# Patient Record
Sex: FEMALE | Race: WHITE | Employment: OTHER | URBAN - METROPOLITAN AREA
[De-identification: names, ages, dates, MRNs, and addresses within clinical notes are randomized per-mention and may not be internally consistent; named-entity substitution may affect disease eponyms.]

---

## 2017-05-26 ENCOUNTER — HOSPITAL ENCOUNTER (OUTPATIENT)
Dept: OTHER | Age: 62
Discharge: HOME | End: 2017-05-26
Attending: CLINICAL NURSE SPECIALIST | Admitting: CLINICAL NURSE SPECIALIST

## 2017-05-26 LAB
ANION GAP SERPL CALCULATED.3IONS-SCNC: 16 MMOL/L (ref 3–16)
BASOPHILS ABSOLUTE: 0.1 K/UL (ref 0–0.2)
BASOPHILS RELATIVE PERCENT: 1 %
BUN BLDV-MCNC: 14 MG/DL (ref 7–20)
CALCIUM SERPL-MCNC: 8.9 MG/DL (ref 8.3–10.6)
CHLORIDE BLD-SCNC: 100 MMOL/L (ref 99–110)
CO2: 25 MMOL/L (ref 21–32)
CORTISOL - AM: 9.4 UG/DL (ref 4.3–22.4)
CREAT SERPL-MCNC: 0.6 MG/DL (ref 0.6–1.1)
EOSINOPHILS ABSOLUTE: 0.1 K/UL (ref 0–0.6)
EOSINOPHILS RELATIVE PERCENT: 1.4 %
GFR AFRICAN AMERICAN: >60
GFR NON-AFRICAN AMERICAN: >60
GLUCOSE BLD-MCNC: 104 MG/DL (ref 70–99)
HCT VFR BLD CALC: 45.3 % (ref 36–48)
HEMOGLOBIN: 14.9 G/DL (ref 12–16)
IGA: 203 MG/DL (ref 70–400)
LYMPHOCYTES ABSOLUTE: 2.2 K/UL (ref 1–5.1)
LYMPHOCYTES RELATIVE PERCENT: 21.4 %
MCH RBC QN AUTO: 28.5 PG (ref 26–34)
MCHC RBC AUTO-ENTMCNC: 33 G/DL (ref 31–36)
MCV RBC AUTO: 86.3 FL (ref 80–100)
MONOCYTES ABSOLUTE: 0.6 K/UL (ref 0–1.3)
MONOCYTES RELATIVE PERCENT: 5.5 %
NEUTROPHILS ABSOLUTE: 7.2 K/UL (ref 1.7–7.7)
NEUTROPHILS RELATIVE PERCENT: 70.7 %
PDW BLD-RTO: 15 % (ref 12.4–15.4)
PLATELET # BLD: 254 K/UL (ref 135–450)
PMV BLD AUTO: 8.9 FL (ref 5–10.5)
POTASSIUM SERPL-SCNC: 4.3 MMOL/L (ref 3.5–5.1)
RBC # BLD: 5.24 M/UL (ref 4–5.2)
SODIUM BLD-SCNC: 141 MMOL/L (ref 136–145)
TSH SERPL DL<=0.05 MIU/L-ACNC: 2.6 UIU/ML (ref 0.27–4.2)
WBC # BLD: 10.1 K/UL (ref 4–11)

## 2017-05-28 LAB — TISSUE TRANSGLUTAMINASE IGA: 1 U/ML (ref 0–3)

## 2017-11-06 ENCOUNTER — OFFICE VISIT (OUTPATIENT)
Dept: SURGERY | Age: 62
End: 2017-11-06

## 2017-11-06 VITALS
BODY MASS INDEX: 43.05 KG/M2 | HEIGHT: 61 IN | DIASTOLIC BLOOD PRESSURE: 80 MMHG | WEIGHT: 228 LBS | SYSTOLIC BLOOD PRESSURE: 124 MMHG

## 2017-11-06 DIAGNOSIS — R10.31 CHRONIC RLQ PAIN: Primary | ICD-10-CM

## 2017-11-06 DIAGNOSIS — G89.29 CHRONIC RLQ PAIN: Primary | ICD-10-CM

## 2017-11-06 PROCEDURE — 99243 OFF/OP CNSLTJ NEW/EST LOW 30: CPT | Performed by: SURGERY

## 2017-11-06 RX ORDER — FLUOXETINE HYDROCHLORIDE 20 MG/1
10 CAPSULE ORAL DAILY
COMMUNITY
End: 2018-03-22 | Stop reason: ALTCHOICE

## 2017-11-06 RX ORDER — ALPRAZOLAM 0.25 MG/1
0.5 TABLET ORAL DAILY
COMMUNITY

## 2017-11-06 ASSESSMENT — ENCOUNTER SYMPTOMS
ABDOMINAL PAIN: 1
NAUSEA: 1
DIARRHEA: 1
CONSTIPATION: 1
EYE REDNESS: 1
ABDOMINAL DISTENTION: 1
EYE DISCHARGE: 1
ALLERGIC/IMMUNOLOGIC NEGATIVE: 1
SHORTNESS OF BREATH: 1
VOMITING: 1
SINUS PRESSURE: 1

## 2017-11-06 NOTE — LETTER
[address] [address]  [address] [place] [address] Road  Phone: [phone]  Fax: [phone]    Kierra Andrew MD        November 9, 2017     [place] [address]    Patient: Margaret Shine  MR Number: [id number]  YOB: 1955  Date of Visit: 11/6/2017    Dear Dr. MARQUEZ [facility]:    Thank you for the request for consultation for [facility]. Below are the relevant portions of my assessment and plan of care. Assessment:     72-year-old female who presents for evaluation of intermittent right lower quadrant abdominal pain over the last 3 years. The pain has been more bothersome over the last 2 months. She reports associated nausea, vomiting, decreased appetite,, sweats, chills and diarrhea. Multiple studies including CAT scan, MRI, vaginal ultrasound and colonoscopy are unremarkable. She has also seen her gynecologist, Dr. Kameron Daley, who feels that there are no obvious gynecologic etiologies of her pain. Plan:     No further workup is warranted at this time. Discussed diagnostic laparoscopy and appendectomy with the patient. She understands that this will likely offer a low yield for a cause of her pain but is very persistent about proceeding with surgery. Will make arrangements to proceed with laparoscopy in combination with her gynecologist Dr. Stephani Verde. If you have questions, please do not hesitate to call me. I look forward to following Frankie Slaughter along with you.     Sincerely,        Kierra Andrew MD

## 2017-11-06 NOTE — PROGRESS NOTES
Subjective:      Maggi Jean Baptiste is a 63 y.o. female     CC: RLQ pain    HPI: 63year old female who presents for evaluation of intermittent RLQ pain over the last 3 years. The pain has been more bothersome over the last 2 months. Associated symptoms N/V, decreased appetite, sweats, chills and diarrhea. No urinary symptoms. Reports fatigue and joint aches. Multiple studies are negative including multiple CT scan, MRI, vaginal U/S and colonoscopy. No obvious gynecologic problems per Dr. [place]. Family History   Problem Relation Age of Onset    Heart Disease Father        Past Medical History:   Diagnosis Date    Hyperlipidemia     Hypertension        Past Surgical History:   Procedure Laterality Date    BLADDER SURGERY      bladder tuck    CHOLECYSTECTOMY      ENDOMETRIAL ABLATION      JOINT REPLACEMENT  2008    left knee           Prior to Visit Medications    Medication Sig Taking? Authorizing Provider   FLUoxetine (PROZAC) 20 MG capsule Take 20 mg by mouth daily Yes Historical Provider, MD   ALPRAZolam (XANAX) 0.5 MG tablet Take 0.5 mg by mouth nightly as needed for Sleep Yes Historical Provider, MD   carvedilol (COREG) 12.5 MG tablet Take 1 tablet by mouth 2 times daily (with meals) Yes Edson Hall NP   ATORVASTATIN CALCIUM PO Take by mouth Yes Historical Provider, MD   venlafaxine (EFFEXOR-XR) 150 MG XR capsule Take 150 mg by mouth daily 2 tabs a day  Historical Provider, MD   hydrochlorothiazide (HYDRODIURIL) 25 MG tablet Take 1 tablet by mouth daily  Marisol Tripathi MD       Social History     Social History    Marital status:      Spouse name: N/A    Number of children: N/A    Years of education: N/A     Occupational History    Not on file.      Social History Main Topics    Smoking status: Current Every Day Smoker     Packs/day: 0.25    Smokeless tobacco: Never Used    Alcohol use No    Drug use: Unknown    Sexual activity: Not on file     Other Topics Concern    Not on file Social History Narrative    No narrative on file       Review of Systems   Constitutional: Positive for activity change, appetite change, chills, fatigue and fever. HENT: Positive for ear pain and sinus pressure. Eyes: Positive for discharge and redness. Respiratory: Positive for shortness of breath. Cardiovascular: Negative. Gastrointestinal: Positive for abdominal distention, abdominal pain, constipation, diarrhea, nausea and vomiting. Endocrine: Negative. Genitourinary: Negative. Musculoskeletal: Negative. Skin: Negative. Allergic/Immunologic: Negative. Neurological: Positive for light-headedness. Hematological: Negative. Psychiatric/Behavioral: Positive for suicidal ideas. The patient is nervous/anxious. Objective:   Physical Exam   Constitutional: She is oriented to person, place, and time. She appears well-developed and well-nourished. HENT:   Head: Normocephalic and atraumatic. Right Ear: External ear normal.   Left Ear: External ear normal.   Eyes: Conjunctivae and EOM are normal.   Neck: Normal range of motion. Neck supple. Cardiovascular: Normal rate and regular rhythm. Pulmonary/Chest: Effort normal and breath sounds normal.   Abdominal: Soft. She exhibits no distension. There is no tenderness. Musculoskeletal: Normal range of motion. She exhibits no edema. Neurological: She is alert and oriented to person, place, and time. Skin: Skin is warm and dry. Psychiatric: She has a normal mood and affect. Her behavior is normal.       Assessment:      65-year-old female who presents for evaluation of intermittent right lower quadrant abdominal pain over the last 3 years. The pain has been more bothersome over the last 2 months. She reports associated nausea, vomiting, decreased appetite,, sweats, chills and diarrhea. Multiple studies including CAT scan, MRI, vaginal ultrasound and colonoscopy are unremarkable.   She has also seen her gynecologist,

## 2017-11-09 PROBLEM — G89.29 CHRONIC RLQ PAIN: Status: ACTIVE | Noted: 2017-11-09

## 2017-11-09 PROBLEM — R10.31 CHRONIC RLQ PAIN: Status: ACTIVE | Noted: 2017-11-09

## 2017-11-09 NOTE — COMMUNICATION BODY
Assessment:     65-year-old female who presents for evaluation of intermittent right lower quadrant abdominal pain over the last 3 years. The pain has been more bothersome over the last 2 months. She reports associated nausea, vomiting, decreased appetite,, sweats, chills and diarrhea. Multiple studies including CAT scan, MRI, vaginal ultrasound and colonoscopy are unremarkable. She has also seen her gynecologist, Dr. Milady Arora, who feels that there are no obvious gynecologic etiologies of her pain. Plan:     No further workup is warranted at this time. Discussed diagnostic laparoscopy and appendectomy with the patient. She understands that this will likely offer a low yield for a cause of her pain but is very persistent about proceeding with surgery. Will make arrangements to proceed with laparoscopy in combination with her gynecologist Dr. Anastacio Conner.

## 2017-11-17 ENCOUNTER — TELEPHONE (OUTPATIENT)
Dept: SURGERY | Age: 62
End: 2017-11-17

## 2017-11-17 NOTE — TELEPHONE ENCOUNTER
Pt is calling for her CT scan result from [address].      Please call pt when we get those    Thanks

## 2017-11-28 ENCOUNTER — HOSPITAL ENCOUNTER (OUTPATIENT)
Dept: OTHER | Age: 62
Discharge: HOME | End: 2017-11-28
Attending: OBSTETRICS & GYNECOLOGY | Admitting: OBSTETRICS & GYNECOLOGY

## 2017-11-28 LAB
A/G RATIO: 1 (ref 1.1–2.2)
ABO/RH: NORMAL
ALBUMIN SERPL-MCNC: 3.7 G/DL (ref 3.4–5)
ALP BLD-CCNC: 110 U/L (ref 40–129)
ALT SERPL-CCNC: 13 U/L (ref 10–40)
ANION GAP SERPL CALCULATED.3IONS-SCNC: 19 MMOL/L (ref 3–16)
ANTIBODY SCREEN: NORMAL
AST SERPL-CCNC: 14 U/L (ref 15–37)
BASOPHILS ABSOLUTE: 0.1 K/UL (ref 0–0.2)
BASOPHILS RELATIVE PERCENT: 1 %
BILIRUB SERPL-MCNC: 0.4 MG/DL (ref 0–1)
BUN BLDV-MCNC: 16 MG/DL (ref 7–20)
CALCIUM SERPL-MCNC: 9.6 MG/DL (ref 8.3–10.6)
CHLORIDE BLD-SCNC: 100 MMOL/L (ref 99–110)
CO2: 25 MMOL/L (ref 21–32)
CREAT SERPL-MCNC: 0.7 MG/DL (ref 0.6–1.2)
EOSINOPHILS ABSOLUTE: 0.1 K/UL (ref 0–0.6)
EOSINOPHILS RELATIVE PERCENT: 1.4 %
GFR AFRICAN AMERICAN: >60
GFR NON-AFRICAN AMERICAN: >60
GLOBULIN: 3.7 G/DL
GLUCOSE BLD-MCNC: 97 MG/DL (ref 70–99)
HCT VFR BLD CALC: 44.4 % (ref 36–48)
HEMOGLOBIN: 14.7 G/DL (ref 12–16)
LYMPHOCYTES ABSOLUTE: 1.9 K/UL (ref 1–5.1)
LYMPHOCYTES RELATIVE PERCENT: 19.2 %
MCH RBC QN AUTO: 28.3 PG (ref 26–34)
MCHC RBC AUTO-ENTMCNC: 33.2 G/DL (ref 31–36)
MCV RBC AUTO: 85.4 FL (ref 80–100)
MONOCYTES ABSOLUTE: 0.5 K/UL (ref 0–1.3)
MONOCYTES RELATIVE PERCENT: 5.1 %
NEUTROPHILS ABSOLUTE: 7.1 K/UL (ref 1.7–7.7)
NEUTROPHILS RELATIVE PERCENT: 73.3 %
PDW BLD-RTO: 15.1 % (ref 12.4–15.4)
PLATELET # BLD: 251 K/UL (ref 135–450)
PMV BLD AUTO: 9.1 FL (ref 5–10.5)
POTASSIUM SERPL-SCNC: 4.7 MMOL/L (ref 3.5–5.1)
RBC # BLD: 5.2 M/UL (ref 4–5.2)
SODIUM BLD-SCNC: 144 MMOL/L (ref 136–145)
TOTAL PROTEIN: 7.4 G/DL (ref 6.4–8.2)
WBC # BLD: 9.7 K/UL (ref 4–11)

## 2017-11-29 LAB
EKG ATRIAL RATE: 65 BPM
EKG DIAGNOSIS: NORMAL
EKG P AXIS: 54 DEGREES
EKG P-R INTERVAL: 154 MS
EKG Q-T INTERVAL: 410 MS
EKG QRS DURATION: 84 MS
EKG QTC CALCULATION (BAZETT): 426 MS
EKG R AXIS: 54 DEGREES
EKG T AXIS: 58 DEGREES
EKG VENTRICULAR RATE: 65 BPM

## 2017-11-29 PROCEDURE — 93010 ELECTROCARDIOGRAM REPORT: CPT | Performed by: INTERNAL MEDICINE

## 2017-12-06 ENCOUNTER — HOSPITAL ENCOUNTER (OUTPATIENT)
Dept: SURGERY | Age: 62
Discharge: HOME | End: 2017-12-06
Attending: SURGERY | Admitting: OBSTETRICS & GYNECOLOGY

## 2017-12-06 VITALS
HEART RATE: 64 BPM | HEIGHT: 61 IN | TEMPERATURE: 97.5 F | SYSTOLIC BLOOD PRESSURE: 147 MMHG | DIASTOLIC BLOOD PRESSURE: 70 MMHG | BODY MASS INDEX: 42.54 KG/M2 | WEIGHT: 225.31 LBS | OXYGEN SATURATION: 94 % | RESPIRATION RATE: 15 BRPM

## 2017-12-06 LAB
ABO/RH: NORMAL
ANTIBODY SCREEN: NORMAL

## 2017-12-06 PROCEDURE — 44970 LAPAROSCOPY APPENDECTOMY: CPT | Performed by: SURGERY

## 2017-12-06 RX ORDER — LIDOCAINE HYDROCHLORIDE 10 MG/ML
0.5 INJECTION, SOLUTION EPIDURAL; INFILTRATION; INTRACAUDAL; PERINEURAL ONCE
Status: DISCONTINUED | OUTPATIENT
Start: 2017-12-06 | End: 2017-12-07 | Stop reason: HOSPADM

## 2017-12-06 RX ORDER — LABETALOL HYDROCHLORIDE 5 MG/ML
5 INJECTION, SOLUTION INTRAVENOUS EVERY 10 MIN PRN
Status: DISCONTINUED | OUTPATIENT
Start: 2017-12-06 | End: 2017-12-07 | Stop reason: HOSPADM

## 2017-12-06 RX ORDER — SODIUM CHLORIDE, SODIUM LACTATE, POTASSIUM CHLORIDE, CALCIUM CHLORIDE 600; 310; 30; 20 MG/100ML; MG/100ML; MG/100ML; MG/100ML
INJECTION, SOLUTION INTRAVENOUS CONTINUOUS
Status: DISCONTINUED | OUTPATIENT
Start: 2017-12-06 | End: 2017-12-07 | Stop reason: HOSPADM

## 2017-12-06 RX ORDER — CEFAZOLIN SODIUM 2 G/100ML
2 INJECTION, SOLUTION INTRAVENOUS ONCE
Status: DISCONTINUED | OUTPATIENT
Start: 2017-12-06 | End: 2017-12-06 | Stop reason: SDUPTHER

## 2017-12-06 RX ORDER — APREPITANT 40 MG/1
40 CAPSULE ORAL ONCE
Status: COMPLETED | OUTPATIENT
Start: 2017-12-06 | End: 2017-12-06

## 2017-12-06 RX ORDER — SODIUM CHLORIDE 0.9 % (FLUSH) 0.9 %
10 SYRINGE (ML) INJECTION EVERY 12 HOURS SCHEDULED
Status: DISCONTINUED | OUTPATIENT
Start: 2017-12-06 | End: 2017-12-07 | Stop reason: HOSPADM

## 2017-12-06 RX ORDER — OXYCODONE HYDROCHLORIDE AND ACETAMINOPHEN 5; 325 MG/1; MG/1
1-2 TABLET ORAL EVERY 4 HOURS PRN
Qty: 30 TABLET | Refills: 0 | Status: SHIPPED | OUTPATIENT
Start: 2017-12-06 | End: 2017-12-13

## 2017-12-06 RX ORDER — HYDROMORPHONE HCL 110MG/55ML
0.5 PATIENT CONTROLLED ANALGESIA SYRINGE INTRAVENOUS EVERY 5 MIN PRN
Status: DISCONTINUED | OUTPATIENT
Start: 2017-12-06 | End: 2017-12-07 | Stop reason: HOSPADM

## 2017-12-06 RX ORDER — HYDRALAZINE HYDROCHLORIDE 20 MG/ML
5 INJECTION INTRAMUSCULAR; INTRAVENOUS EVERY 10 MIN PRN
Status: DISCONTINUED | OUTPATIENT
Start: 2017-12-06 | End: 2017-12-07 | Stop reason: HOSPADM

## 2017-12-06 RX ORDER — SODIUM CHLORIDE 0.9 % (FLUSH) 0.9 %
10 SYRINGE (ML) INJECTION PRN
Status: DISCONTINUED | OUTPATIENT
Start: 2017-12-06 | End: 2017-12-07 | Stop reason: HOSPADM

## 2017-12-06 RX ORDER — PROMETHAZINE HYDROCHLORIDE 25 MG/ML
6.25 INJECTION, SOLUTION INTRAMUSCULAR; INTRAVENOUS EVERY 10 MIN PRN
Status: DISCONTINUED | OUTPATIENT
Start: 2017-12-06 | End: 2017-12-07 | Stop reason: HOSPADM

## 2017-12-06 RX ORDER — ONDANSETRON 2 MG/ML
4 INJECTION INTRAMUSCULAR; INTRAVENOUS
Status: ACTIVE | OUTPATIENT
Start: 2017-12-06 | End: 2017-12-06

## 2017-12-06 RX ORDER — SCOLOPAMINE TRANSDERMAL SYSTEM 1 MG/1
1 PATCH, EXTENDED RELEASE TRANSDERMAL ONCE
Status: DISCONTINUED | OUTPATIENT
Start: 2017-12-06 | End: 2017-12-07 | Stop reason: HOSPADM

## 2017-12-06 RX ORDER — LIDOCAINE HYDROCHLORIDE 10 MG/ML
1 INJECTION, SOLUTION EPIDURAL; INFILTRATION; INTRACAUDAL; PERINEURAL
Status: ACTIVE | OUTPATIENT
Start: 2017-12-06 | End: 2017-12-06

## 2017-12-06 RX ORDER — MEPERIDINE HYDROCHLORIDE 25 MG/ML
12.5 INJECTION INTRAMUSCULAR; INTRAVENOUS; SUBCUTANEOUS EVERY 5 MIN PRN
Status: DISCONTINUED | OUTPATIENT
Start: 2017-12-06 | End: 2017-12-07 | Stop reason: HOSPADM

## 2017-12-06 RX ORDER — OXYCODONE HYDROCHLORIDE AND ACETAMINOPHEN 5; 325 MG/1; MG/1
1 TABLET ORAL
Status: COMPLETED | OUTPATIENT
Start: 2017-12-06 | End: 2017-12-06

## 2017-12-06 RX ADMIN — OXYCODONE HYDROCHLORIDE AND ACETAMINOPHEN 1 TABLET: 5; 325 TABLET ORAL at 15:45

## 2017-12-06 RX ADMIN — SODIUM CHLORIDE, SODIUM LACTATE, POTASSIUM CHLORIDE, CALCIUM CHLORIDE: 600; 310; 30; 20 INJECTION, SOLUTION INTRAVENOUS at 11:35

## 2017-12-06 RX ADMIN — APREPITANT 40 MG: 40 CAPSULE ORAL at 11:39

## 2017-12-06 ASSESSMENT — PAIN SCALES - GENERAL
PAINLEVEL_OUTOF10: 4
PAINLEVEL_OUTOF10: 4

## 2017-12-06 ASSESSMENT — PAIN DESCRIPTION - LOCATION: LOCATION: ABDOMEN

## 2017-12-06 ASSESSMENT — PAIN DESCRIPTION - PAIN TYPE: TYPE: SURGICAL PAIN

## 2017-12-06 ASSESSMENT — PAIN - FUNCTIONAL ASSESSMENT: PAIN_FUNCTIONAL_ASSESSMENT: 0-10

## 2017-12-06 NOTE — ANESTHESIA POST-OP
Anesthesia Post-op Note    Patient: Debora Opitz  MRN: [id number]  YOB: 1955  Date of evaluation: 12/6/2017  Time:  2:37 PM     Procedure(s) Performed:     Last Vitals: /79   Pulse 60   Temp 97.5 °F (36.4 °C) (Temporal)   Resp 17   Ht 5' 1\" (1.549 m)   Wt 225 lb 5 oz (102.2 kg)   SpO2 98%   BMI 42.57 kg/m²     Antonietta Phase I: Antonietta Score: 3    Antonietta Phase II:      Anesthesia Post Evaluation    Final anesthesia type: general  Patient location during evaluation: PACU  Patient participation: complete - patient participated  Level of consciousness: awake and alert  Airway patency: patent  Nausea & Vomiting: no nausea and no vomiting  Complications: no  Cardiovascular status: hemodynamically stable  Respiratory status: acceptable  Hydration status: stable        Nicole Lloyd MD  2:37 PM

## 2017-12-06 NOTE — PROGRESS NOTES
Discharge instructions reviewed with pt and pt's mom, discussed medications, VU, denies any further questions or anxiety related to discharge. IV discontinued and dressing applied. Pt discharged to home with mother. Taken from room via wheelchair by this nurse, personal belongings taken with. New medications supplied through outpatient pharmacy.

## 2017-12-06 NOTE — ANESTHESIA PRE-OP
Department of Anesthesiology  Preprocedure Note       Name:  Chica Kahn   Age:  63 y.o.  :  1955                                          MRN:  [id number]         Date:  2017      Surgeon:    Procedure:    Medications prior to admission:   Prior to Admission medications    Medication Sig Start Date End Date Taking? Authorizing Provider   aspirin-acetaminophen-caffeine (EXCEDRIN MIGRAINE) 431-180-78 MG per tablet Take 1 tablet by mouth every 6 hours as needed for Headaches    Historical Provider, MD   FLUoxetine (PROZAC) 20 MG capsule Take 10 mg by mouth daily     Historical Provider, MD   ALPRAZolam (XANAX) 0.5 MG tablet Take 0.5 mg by mouth 2 times daily as needed for Sleep  . Historical Provider, MD   carvedilol (COREG) 12.5 MG tablet Take 1 tablet by mouth 2 times daily (with meals)  Patient taking differently: Take 25 mg by mouth 2 times daily (with meals)  12/23/15   Debra Rodriguez NP   ATORVASTATIN CALCIUM PO Take 40 mg by mouth daily     Historical Provider, MD   hydrochlorothiazide (HYDRODIURIL) 25 MG tablet Take 1 tablet by mouth daily  Patient taking differently: Take 25 mg by mouth daily NOT TAKING 12/8/15   Ryan Josue MD       Current medications:    Current Outpatient Prescriptions   Medication Sig Dispense Refill    aspirin-acetaminophen-caffeine (EXCEDRIN MIGRAINE) 250-250-65 MG per tablet Take 1 tablet by mouth every 6 hours as needed for Headaches      FLUoxetine (PROZAC) 20 MG capsule Take 10 mg by mouth daily       ALPRAZolam (XANAX) 0.5 MG tablet Take 0.5 mg by mouth 2 times daily as needed for Sleep  .       carvedilol (COREG) 12.5 MG tablet Take 1 tablet by mouth 2 times daily (with meals) (Patient taking differently: Take 25 mg by mouth 2 times daily (with meals) ) 60 tablet 3    ATORVASTATIN CALCIUM PO Take 40 mg by mouth daily       hydrochlorothiazide (HYDRODIURIL) 25 MG tablet Take 1 tablet by mouth daily (Patient taking differently: Take 25 mg by mouth daily comment: Current smoker   Cardiovascular:    (+) hypertension:, hyperlipidemia    (-) past MI, CAD, CABG/stent, dysrhythmias,  angina and  CHF    ECG reviewed  Rhythm: regular  Rate: normal           Beta Blocker:  Dose within 24 Hrs         Neuro/Psych:   (+) headaches: migraine headaches, psychiatric history (Anxiety, Depression):depression/anxiety    (-) seizures, TIA and CVA           GI/Hepatic/Renal:   (+) morbid obesity     (-) GERD, liver disease and no renal disease       Endo/Other: Negative Endo/Other ROS       (-) hypothyroidism, hyperthyroidism, no Type II DM               Abdominal:   (+) obese (morbid),         Vascular:                                      Anesthesia Plan      general     ASA 3       Induction: intravenous. Anesthetic plan and risks discussed with patient. Plan discussed with CRNA.                   Sherry Swanson MD   12/6/2017

## 2017-12-06 NOTE — BRIEF OP NOTE
Brief Postoperative Note    Ravi Kahn  YOB: 1955  [id number]    Pre-operative Diagnosis: chronic RLQ pain     Post-operative Diagnosis: Same    Procedure: laparoscopy, removal of R tube and ovary, appendectomy    Anesthesia: General and Local    Surgeons/Assistants: Nidia Boone    Estimated Blood Loss: less than 50     Complications: None    Specimens: Was Obtained: R tube and ovary, appendix    Findings:     Electronically signed by Jose Shields MD on 12/6/2017 at 1:53 PM

## 2017-12-06 NOTE — PROGRESS NOTES
Dr. Domenica Crane in to see patient. All questions answered. H & P updated and antibiotic ordered. Family at bedside.   Jennifer Daley

## 2017-12-07 NOTE — OP NOTE
[facility]                      [address] [facility], [address] [facility]                                 OPERATIVE REPORT    PATIENT NAME: Rodríguez Meeks                     :        1955  MED REC NO:   [id number]                          ROOM:  ACCOUNT NO:   [de-identified]                          ADMIT DATE: 2017  PROVIDER:     Anastacio Conner MD    DATE OF PROCEDURE:  2017        PREOPERATIVE DIAGNOSIS:  Chronic right lower quadrant pain. POSTOPERATIVE DIAGNOSIS:  Chronic right lower quadrant pain. OPERATION PERFORMED:  Diagnostic laparoscopy, right salpingo-oophorectomy,  and drainage of left ovarian cyst.    SURGEON:  Anastacio Conner M.D.    ESTIMATED BLOOD LOSS:  Minimal.    COMPLICATIONS:  None. FINDINGS:  1. Irregular-shaped uterus consistent with known fibroid. 2.  Left ovary with simple ovarian cyst, drained. 3.  Clear anterior and posterior cul-de-sac. 4.  Right adnexa within normal limits. INDICATIONS:  The patient is a 74-year-old female who has had a long-term  history of right lower quadrant pain. She has undergone CT of the abdomen  and pelvis and colonoscopy, neither of which revealed any pathology. She  last had an ultrasound of the pelvis in , which did show a small  ovarian cyst and small fibroids. At this point, she is requesting  diagnostic laparoscopy, which will be performed in conjunction with Dr. Efe Blanca. Risks, benefits, and alternatives were reviewed with the  patient prior to surgery. DETAILS OF PROCEDURE:  The patient was taken to the operating room where  general anesthesia was performed. She was then placed in dorsal lithotomy  position, prepped and draped in the normal sterile fashion. A single-tooth  tenaculum was placed on the anterior lip of the cervix and the cervix was  gently dilated to allow passage of a Kroner uterine manipulator.   A Torres  was placed and attention was turned to the
completion of Dr. Abdiel Littlejohn procedure, the appendix was identified in  the right lower quadrant. It was grossly unremarkable. The mesoappendix  was divided with the LigaSure and then the appendix was cleared  circumferentially at the base. The appendix was divided with a transverse  firing of an Endo MAXIM blue stapler load. It was then placed in the  EndoCatch bag and removed through the left lower quadrant trocar site. Trocar was then reinserted. The cecum appeared grossly unremarkable. We  had excellent hemostasis at the operative site and good security of the  staple line on the cecum. In a retrograde fashion, we ran at least 2 feet  of small bowel. She has no evidence of Meckel diverticulum. There was no  evidence of active Crohn disease. The fascia of the left lower quadrant trocar site was closed with a 0  Vicryl suture using a Chevy Getting needle. The other two trocars were  then removed under direct visualization and the abdomen was de-insufflated. All areas were injected with 0.5% Marcaine with epinephrine for the skin  and the incisions were closed with running 4-0 subcuticular sutures. Dermabond was then applied. The patient tolerated the procedure without  difficulty and was transferred to recovery room in stable condition.         Familia Spear MD    D: 12/06/2017 14:10:28       T: 12/06/2017 14:21:08     [id number]  Job#: [id number]     Doc#: [id number]    CC:  MD Lalo Sims MD

## 2018-03-17 LAB — C DIFFICILE TOXIN, EIA: NORMAL

## 2018-03-18 LAB
CLOSTRIDIUM DIFFICILE DNA AMPLIFICATION: NORMAL
GI BACTERIAL PATHOGENS BY PCR: NORMAL

## 2018-03-19 LAB
CRYPTOSPORIDIUM ANTIGEN STOOL: NORMAL
E HISTOLYTICA ANTIGEN STOOL: NORMAL
GIARDIA ANTIGEN STOOL: NORMAL

## 2018-03-22 ENCOUNTER — HOSPITAL ENCOUNTER (OUTPATIENT)
Dept: OTHER | Age: 63
Discharge: HOME | End: 2018-03-22
Attending: INTERNAL MEDICINE | Admitting: INTERNAL MEDICINE

## 2018-04-16 ENCOUNTER — HOSPITAL ENCOUNTER (OUTPATIENT)
Dept: OTHER | Age: 63
Discharge: HOME | End: 2018-04-16
Attending: INTERNAL MEDICINE | Admitting: INTERNAL MEDICINE

## 2018-04-18 ENCOUNTER — HOSPITAL ENCOUNTER (OUTPATIENT)
Dept: ENDOSCOPY | Age: 63
Discharge: HOME | End: 2018-04-18
Attending: INTERNAL MEDICINE | Admitting: INTERNAL MEDICINE

## 2018-04-18 RX ORDER — CARVEDILOL 25 MG/1
25 TABLET ORAL ONCE
Status: DISCONTINUED | OUTPATIENT
Start: 2018-04-18 | End: 2018-04-19 | Stop reason: HOSPADM

## 2018-05-10 ENCOUNTER — TELEPHONE (OUTPATIENT)
Dept: SURGERY | Age: 63
End: 2018-05-10

## 2018-05-29 ENCOUNTER — OFFICE VISIT (OUTPATIENT)
Dept: SURGERY | Age: 63
End: 2018-05-29

## 2018-05-29 VITALS
WEIGHT: 202 LBS | HEIGHT: 60 IN | BODY MASS INDEX: 39.66 KG/M2 | DIASTOLIC BLOOD PRESSURE: 96 MMHG | SYSTOLIC BLOOD PRESSURE: 138 MMHG

## 2018-05-29 DIAGNOSIS — G89.29 CHRONIC RLQ PAIN: Primary | ICD-10-CM

## 2018-05-29 DIAGNOSIS — R10.31 CHRONIC RLQ PAIN: Primary | ICD-10-CM

## 2018-05-29 PROCEDURE — 99214 OFFICE O/P EST MOD 30 MIN: CPT | Performed by: SURGERY

## 2018-05-29 ASSESSMENT — ENCOUNTER SYMPTOMS
SINUS PRESSURE: 1
ANAL BLEEDING: 1
ALLERGIC/IMMUNOLOGIC NEGATIVE: 1
NAUSEA: 1
RESPIRATORY NEGATIVE: 1
EYE DISCHARGE: 1
CONSTIPATION: 1
ABDOMINAL PAIN: 1
BACK PAIN: 1

## 2019-06-06 ENCOUNTER — HOSPITAL ENCOUNTER (EMERGENCY)
Age: 64
Discharge: HOME | End: 2019-06-06
Attending: EMERGENCY MEDICINE
Payer: COMMERCIAL

## 2019-06-06 VITALS
DIASTOLIC BLOOD PRESSURE: 89 MMHG | SYSTOLIC BLOOD PRESSURE: 140 MMHG | OXYGEN SATURATION: 97 % | TEMPERATURE: 98 F | RESPIRATION RATE: 16 BRPM | HEART RATE: 66 BPM

## 2019-06-06 DIAGNOSIS — R10.9 CHRONIC ABDOMINAL PAIN: Primary | ICD-10-CM

## 2019-06-06 DIAGNOSIS — G89.29 CHRONIC ABDOMINAL PAIN: Primary | ICD-10-CM

## 2019-06-06 LAB
ALBUMIN SERPL-MCNC: 3.9 G/DL (ref 3.4–5)
ALP BLD-CCNC: 93 U/L (ref 40–129)
ALT SERPL-CCNC: 20 U/L (ref 10–40)
ANION GAP SERPL CALCULATED.3IONS-SCNC: 13 MMOL/L (ref 3–16)
AST SERPL-CCNC: 44 U/L (ref 15–37)
BASOPHILS ABSOLUTE: 0.1 K/UL (ref 0–0.2)
BASOPHILS RELATIVE PERCENT: 0.5 %
BILIRUB SERPL-MCNC: 0.3 MG/DL (ref 0–1)
BILIRUBIN DIRECT: <0.2 MG/DL (ref 0–0.3)
BILIRUBIN URINE: NEGATIVE
BILIRUBIN, INDIRECT: ABNORMAL MG/DL (ref 0–1)
BLOOD, URINE: ABNORMAL
BUN BLDV-MCNC: 15 MG/DL (ref 7–20)
CALCIUM SERPL-MCNC: 9.3 MG/DL (ref 8.3–10.6)
CHLORIDE BLD-SCNC: 103 MMOL/L (ref 99–110)
CLARITY: CLEAR
CO2: 23 MMOL/L (ref 21–32)
COLOR: YELLOW
CREAT SERPL-MCNC: 0.6 MG/DL (ref 0.6–1.2)
EOSINOPHILS ABSOLUTE: 0.1 K/UL (ref 0–0.6)
EOSINOPHILS RELATIVE PERCENT: 0.7 %
EPITHELIAL CELLS, UA: NORMAL /HPF
GFR AFRICAN AMERICAN: >60
GFR NON-AFRICAN AMERICAN: >60
GLUCOSE BLD-MCNC: 88 MG/DL (ref 70–99)
GLUCOSE URINE: NEGATIVE MG/DL
HCT VFR BLD CALC: 43.7 % (ref 36–48)
HEMOGLOBIN: 14.7 G/DL (ref 12–16)
KETONES, URINE: NEGATIVE MG/DL
LEUKOCYTE ESTERASE, URINE: ABNORMAL
LIPASE: 16 U/L (ref 13–60)
LYMPHOCYTES ABSOLUTE: 2.4 K/UL (ref 1–5.1)
LYMPHOCYTES RELATIVE PERCENT: 22.1 %
MCH RBC QN AUTO: 28.8 PG (ref 26–34)
MCHC RBC AUTO-ENTMCNC: 33.6 G/DL (ref 31–36)
MCV RBC AUTO: 85.7 FL (ref 80–100)
MICROSCOPIC EXAMINATION: YES
MONOCYTES ABSOLUTE: 0.6 K/UL (ref 0–1.3)
MONOCYTES RELATIVE PERCENT: 5.3 %
NEUTROPHILS ABSOLUTE: 7.6 K/UL (ref 1.7–7.7)
NEUTROPHILS RELATIVE PERCENT: 71.4 %
NITRITE, URINE: NEGATIVE
PDW BLD-RTO: 14.3 % (ref 12.4–15.4)
PH UA: 5.5 (ref 5–8)
PLATELET # BLD: 260 K/UL (ref 135–450)
PMV BLD AUTO: 9.6 FL (ref 5–10.5)
POTASSIUM REFLEX MAGNESIUM: 5.2 MMOL/L (ref 3.5–5.1)
PROTEIN UA: NEGATIVE MG/DL
RBC # BLD: 5.1 M/UL (ref 4–5.2)
RBC UA: NORMAL /HPF (ref 0–2)
SODIUM BLD-SCNC: 139 MMOL/L (ref 136–145)
SPECIFIC GRAVITY UA: 1.02 (ref 1–1.03)
TOTAL PROTEIN: 6.9 G/DL (ref 6.4–8.2)
URINE TYPE: ABNORMAL
UROBILINOGEN, URINE: 0.2 E.U./DL
WBC # BLD: 10.7 K/UL (ref 4–11)
WBC UA: NORMAL /HPF (ref 0–5)

## 2019-06-06 PROCEDURE — 83690 ASSAY OF LIPASE: CPT

## 2019-06-06 PROCEDURE — 6360000002 HC RX W HCPCS: Performed by: EMERGENCY MEDICINE

## 2019-06-06 PROCEDURE — 99284 EMERGENCY DEPT VISIT MOD MDM: CPT

## 2019-06-06 PROCEDURE — 85025 COMPLETE CBC W/AUTO DIFF WBC: CPT

## 2019-06-06 PROCEDURE — 81001 URINALYSIS AUTO W/SCOPE: CPT

## 2019-06-06 PROCEDURE — 80076 HEPATIC FUNCTION PANEL: CPT

## 2019-06-06 PROCEDURE — 96374 THER/PROPH/DIAG INJ IV PUSH: CPT

## 2019-06-06 PROCEDURE — 80048 BASIC METABOLIC PNL TOTAL CA: CPT

## 2019-06-06 RX ORDER — KETOROLAC TROMETHAMINE 30 MG/ML
15 INJECTION, SOLUTION INTRAMUSCULAR; INTRAVENOUS ONCE
Status: COMPLETED | OUTPATIENT
Start: 2019-06-06 | End: 2019-06-06

## 2019-06-06 RX ADMIN — KETOROLAC TROMETHAMINE 15 MG: 30 INJECTION, SOLUTION INTRAMUSCULAR; INTRAVENOUS at 16:38

## 2019-06-06 ASSESSMENT — ENCOUNTER SYMPTOMS
ANAL BLEEDING: 0
RECTAL PAIN: 0
ABDOMINAL PAIN: 1
BLOOD IN STOOL: 0
NAUSEA: 0
DIARRHEA: 1
SHORTNESS OF BREATH: 0
VOMITING: 0
WHEEZING: 0
COUGH: 0
ABDOMINAL DISTENTION: 0
CONSTIPATION: 1

## 2019-06-06 ASSESSMENT — PAIN SCALES - GENERAL
PAINLEVEL_OUTOF10: 8
PAINLEVEL_OUTOF10: 8

## 2019-06-06 NOTE — ED TRIAGE NOTES
Pt arrived with c/o chronic abd pain. Pt thinks her anxiety may be the cause. Pt has had suicide attempts in the past but last one was 2-3 years ago. Pt denies si/hi currently. Pt is awake alert and oriented to baseline. Respirations equal and unlabored. Skin is warm, dry and appropriate color.

## 2019-06-06 NOTE — ED NOTES
Patient prepared for and ready to be discharged. Dressed in clothes and given belongings. IV removed, pt tolerated well, no complications. Patient discharged at this time in no acute distress after Patient verbalized understanding of discharge instructions. Reviewed medications, and when to return to the ED with patient. Encouraged follow up with GI  Patient walked to [facility], Patient to drive home.        [address], RN  06/06/19 8505

## 2019-06-06 NOTE — ED PROVIDER NOTES
Date ofevaluation: 6/6/2019    Chief Complaint   Abdominal Pain (right sided); Fatigue; Constipation; Anxiety; and Depression    Nursing Notes, Past Medical Hx, Past Surgical Hx, Social Hx, Allergies, and Family Hx were reviewed. History of Present Illness     Gin Wang is a 66 y.o. female who presents with multiple complaints including right-sided abdominal pain, constipation alternating with diarrhea, fatigue, anxiety and depression. She has been having abdominal pain for approximately 8 years. It has been similar in quality and location to the pain she has today. She's had an exhaustive workup including numerous CT scans, colonoscopies, evaluation by specialties, and exploratory surgery, appendectomy and ovarian removal.  All of this has not located an etiology of her pain nor given her any relief. Her current spell of pain has been going on for the past month and a half. It is unchanged. She does not have any associated fevers, vomiting, nausea, dysuria, hematuria. Today she went and saw a \"holistic doctor\" who sold her $300 probiotics and recommended that she come to the emergency department for evaluation of her depression. She states that she's been depressed for the past 7 years since her son committed suicide after suffering from PTSD in [place]. She is not actively suicidal and does not have any plan for suicide. She is sometimes feels hopeless and has difficulty concentrating but does not feel like her depression is so out of control at this point that she is not able to carry on her activities of daily living that she requires inpatient evaluation for her mental health. She has seen psychiatry and mental health providers at the [facility]. She has been on multiple antidepressants but felt like they made her gain weight and gave her worsening constipation/diarrhea since she is not currently on. Review of Systems     Review of Systems   Constitutional: Positive for fatigue. Negative for activity change, appetite change and fever. HENT: Negative for congestion. Respiratory: Negative for cough, shortness of breath and wheezing. Cardiovascular: Negative for chest pain and palpitations. Gastrointestinal: Positive for abdominal pain, constipation and diarrhea. Negative for abdominal distention, anal bleeding, blood in stool, nausea, rectal pain and vomiting. Genitourinary: Negative for difficulty urinating and dysuria. Skin: Negative for pallor and rash. Neurological: Negative for weakness and numbness. Psychiatric/Behavioral: Negative for self-injury and suicidal ideas. All other systems reviewed and are negative. Past Medical, Surgical, Family, and Social History         Diagnosis Date    Anxiety and depression     Diverticulitis     Grief at loss of child     Hyperlipidemia     Hypertension     PONV (postoperative nausea and vomiting)          Procedure Laterality Date    APPENDECTOMY      BLADDER SURGERY      bladder tuck    CHOLECYSTECTOMY      ENDOMETRIAL ABLATION      JOINT REPLACEMENT  2008    left knee    LAPAROSCOPY  12/06/2017    appendectomy and removal of right fallopian tube and ovary     Her family history includes Heart Disease in her father. She reports that she has been smoking. She has been smoking about 0.00 packs per day for the past 25.00 years. She has never used smokeless tobacco. She reports that she does not drink alcohol. Medications     Previous Medications    ALPRAZOLAM (XANAX) 0.5 MG TABLET    Take 0.5 mg by mouth 2 times daily as needed for Sleep  .     ASPIRIN-ACETAMINOPHEN-CAFFEINE (EXCEDRIN MIGRAINE) 250-250-65 MG PER TABLET    Take 1 tablet by mouth every 6 hours as needed for Headaches    ATORVASTATIN CALCIUM PO    Take 40 mg by mouth daily     CARVEDILOL (COREG) 12.5 MG TABLET    Take 1 tablet by mouth 2 times daily (with meals)    RIFAXIMIN (XIFAXAN) 550 MG TABLET    Take 550 mg by mouth       Allergies     She is allergic to bupropion; buspirone; citalopram hydrobromide; diazepam; divalproex sodium; lisinopril; naproxen sodium; phenylephrine-guaifenesin; quetiapine; topiramate; and pcn [penicillins]. Physical Exam     INITIAL VITALS: BP (!) 143/93   Pulse 66   Temp 98 °F (36.7 °C) (Oral)   Resp 16   SpO2 93%    Physical Exam   Constitutional: She is oriented to person, place, and time. She appears well-developed and well-nourished. No distress. HENT:   Head: Normocephalic and atraumatic. Eyes: Conjunctivae are normal.   Neck: Neck supple. Cardiovascular: Normal rate, regular rhythm, normal heart sounds and intact distal pulses. Pulmonary/Chest: Effort normal and breath sounds normal. No respiratory distress. Abdominal: Soft. Bowel sounds are normal. She exhibits no distension and no mass. There is no tenderness. There is no rebound and no guarding. No hernia. Musculoskeletal: Normal range of motion. She exhibits no edema or deformity. Neurological: She is alert and oriented to person, place, and time. She displays normal reflexes. No cranial nerve deficit. Coordination normal.   Skin: Skin is warm and dry. Capillary refill takes less than 2 seconds. Psychiatric: She has a normal mood and affect. Her behavior is normal.   Nursing note and vitals reviewed. Diagnostic Results       RADIOLOGY:  No orders to display       LABS:   Labs Reviewed   BASIC METABOLIC PANEL W/ REFLEX TO MG FOR LOW K - Abnormal; Notable for the following components:       Result Value    Potassium reflex Magnesium 5.2 (*)     All other components within normal limits    Narrative:     Performed at: [facility]  [address]   Phone [phone]   HEPATIC FUNCTION PANEL - Abnormal; Notable for the following components:    AST 44 (*)     All other components within normal limits    Narrative:     Performed at:   The [address] [place] [address] [facility]  [address] ,  [place]   Phone [phone]   URINALYSIS - Abnormal; Notable for the following components:    Blood, Urine TRACE-INTACT (*)     Leukocyte Esterase, Urine TRACE (*)     All other components within normal limits    Narrative:     Performed at: [facility]  [address],  [place], [address]   Phone [phone]   CBC WITH AUTO DIFFERENTIAL    Narrative:     Performed at: [facility]  [address],  [place], [address]   Phone [phone]   LIPASE    Narrative:     Performed at: [facility]  [address] ,  [place]   Phone [phone]   MICROSCOPIC URINALYSIS    Narrative:     Performed at: [facility]  [address] ,  [place]   Phone [phone]       RECENTVITALS:  BP: (!) 143/93, Temp: 98 °F (36.7 °C), Pulse: 66, Resp: 16     Procedures       ED Course     The patient was given the following medications:  Orders Placed This Encounter   Medications    ketorolac (TORADOL) injection 15 mg            CONSULTS:  None    MEDICAL DECISION MAKING     Corine Silver is a 66 y.o. female with a past medical history notable for chronic pain presenting with right lower quadrant abdominal pain that is consistent with her chronic pain. She is status post appendectomy, numerous CT scans, colonoscopy and exhaustive workup for this pain over the past 8 years. This pain is no different in location or quality is consistent with her chronic pain. She's been afebrile, is tolerating p.o. She has a benign abdominal exam and laboratory workup. She was given Toradol which improved her pain. She also reports having symptoms of anxiety and depression. She is not suicidal and is able to carry out her activities of daily living.  I discussed with the patient that she would likely benefit from a seen a psychologist as well as potentially trying a different antidepressant. She'll discuss this with her primary care provider and is open to psychology. Discharge instructions including strict return precautions were given to the patient. All questions were addressed. The patient verbalizes understanding and is in agreement with the plan. This patient was also evaluated by the attending physician. All care plans were discussed and agreedupon. Clinical Impression     1.  Chronic abdominal pain        Disposition/Plan     PATIENT REFERRED TO:  Consuelo Lenz MD  [address]  [phone]            DISCHARGE MEDICATIONS:  New Prescriptions    No medications on file       DISPOSITION  Discharged in stable condition           Prema Quintana MD  Resident  06/06/19 7544

## 2020-01-16 LAB
HPV COMMENT: NORMAL
HPV TYPE 16: NOT DETECTED
HPV TYPE 18: NOT DETECTED
HPVOH (OTHER TYPES): NOT DETECTED

## 2020-03-03 PROBLEM — E78.00 HYPERCHOLESTEREMIA: Status: ACTIVE | Noted: 2020-03-03

## 2020-03-04 ENCOUNTER — OFFICE VISIT (OUTPATIENT)
Dept: CARDIOLOGY CLINIC | Age: 65
End: 2020-03-04
Payer: COMMERCIAL

## 2020-03-04 VITALS
OXYGEN SATURATION: 96 % | DIASTOLIC BLOOD PRESSURE: 82 MMHG | WEIGHT: 200.8 LBS | BODY MASS INDEX: 37.91 KG/M2 | HEIGHT: 61 IN | SYSTOLIC BLOOD PRESSURE: 132 MMHG | HEART RATE: 83 BPM

## 2020-03-04 PROCEDURE — 99204 OFFICE O/P NEW MOD 45 MIN: CPT | Performed by: INTERNAL MEDICINE

## 2020-03-04 PROCEDURE — 93000 ELECTROCARDIOGRAM COMPLETE: CPT | Performed by: INTERNAL MEDICINE

## 2020-03-04 RX ORDER — ICOSAPENT ETHYL 1000 MG/1
CAPSULE ORAL
COMMUNITY
Start: 2020-01-17

## 2020-03-04 RX ORDER — CHOLECALCIFEROL (VITAMIN D3) 125 MCG
CAPSULE ORAL WEEKLY
COMMUNITY

## 2020-03-04 RX ORDER — MULTIVITAMIN WITH IRON
250 TABLET ORAL DAILY
COMMUNITY

## 2020-03-04 NOTE — LETTER
[address] [facility]iology [place] [address] [address] [address]  Phone: [phone]  Fax: [phone]    Darwin Santos MD        March 4, 2020     [address] [place], [place] [address] [address]    Patient: Kareen Schaffer  MR Number: [id number]  YOB: 1955  Date of Visit: 3/4/2020    Dear  [address] [place] [address]:      [address]     H+P // CONSULT // OUTPATIENT VISIT // [facility] [place] VISIT     Referring Doctor [address] [place] [address]   Encounter Type New     CHIEF COMPLAINT     Visit Type Acute   Symptoms None   Problems HTN, CHOL     HISTORY OF PRESENT ILLNESS     ? GEN - New patient. Reports sob with exertion, mild right sided cp. Cp right sided, radiating to right arm and neck, nonexertional, lasts <60 seconds. CAD risks - htn, chol, fam, tob  ? HTN - Ambulatory BP readings in good range. No HA or dizziness. No longer on coreg. ? CHOL - Last cholesterol reviewed and in good range. Stopped statin and only on vascepa. ? TOB -active smoker 1/2ppd  ? MED - Compliant with CV meds listed below without notable side effects. HISTORY/ALLERGIES/ROS     MedHx:   has a past medical history of Anxiety and depression, Diverticulitis, Grief at loss of child, Hyperlipidemia, Hypertension, and PONV (postoperative nausea and vomiting). SurgHx:  has a past surgical history that includes joint replacement (2008); Cholecystectomy; Bladder surgery; Endometrial ablation; laparoscopy (12/06/2017); and Appendectomy. SocHx:   reports that she has been smoking. She has been smoking about 0.00 packs per day for the past 25.00 years. She has never used smokeless tobacco. She reports that she does not drink alcohol. FamHx:  family history includes Heart Disease in her father. Allergies: Bupropion; Buspirone; Citalopram hydrobromide; Diazepam; Divalproex sodium; Lisinopril; Naproxen sodium; Phenylephrine-guaifenesin; Quetiapine;  Topiramate; and Pcn [penicillins] ROS:  [x]Full ROS obtained and negative except as mentioned in HPI     MEDICATIONS      Current Outpatient Medications   Medication Sig Dispense Refill    rifaximin (XIFAXAN) 550 MG tablet Take 550 mg by mouth      aspirin-acetaminophen-caffeine (EXCEDRIN MIGRAINE) 250-250-65 MG per tablet Take 1 tablet by mouth every 6 hours as needed for Headaches      ALPRAZolam (XANAX) 0.5 MG tablet Take 0.5 mg by mouth 2 times daily as needed for Sleep  .  carvedilol (COREG) 12.5 MG tablet Take 1 tablet by mouth 2 times daily (with meals) (Patient taking differently: Take 25 mg by mouth 2 times daily (with meals) ) 60 tablet 3    ATORVASTATIN CALCIUM PO Take 40 mg by mouth daily        No current facility-administered medications for this visit.       Reviewed with patient and will remain unchanged except as mentioned in A/P  PHYSICAL EXAM     Vitals:    03/04/20 1357   BP: 132/82   Pulse:    SpO2:       Gen Alert, coop, no distress Heart  Rrr, no mrg   Head NC, AT, no abnorm Abd  Soft, NT, +BS, no mass, no OM   Eyes PER, conj/corn clear Ext  Ext nl, AT, no C/C/E   Nose Nares nl, no drain, NT Pulse 2+ and symmetric   Throat Lips, mucosa, tongue nl Skin Col/text/turg nl, no vis rash/les   Neck S/S, TM, NT, no bruit/JVD Psych Nl mood and affect   Lung CTA-B, unlabored, no DTP Lymph   No cervical or axillary LA   Ch wall NT, no deform Neuro  Nl gross M/S exam     ASSESSMENT AND PLAN     ~CP/SOB  Mixed typical/atypical sx  Plan MPI, patient unlikely to be able to walk on treadmill to target HR    Stop smoking  ~HTN  Today BP Controlled   Counseling Counseled on diet/salt, exercise and ideal body weight   Plan Continue current meds at doses above   12/15 STTE-> Normal, EF=60%  ~Hyperchol  Goal LDL <70   Counseling Counseled on diet, exercise and ideal body weight   Plan PCP liver/lipid surveillance, continue current meds at doses above   ~Tobacco  Status Active smoker Counseling Counseled on risks, cessation therapies discussed   Plan Continued avoidance of first and second hand smoke   ~Compliance  Discussed importance of compliance with meds/diet/salt/exercise; avoidance of tobacco/alcohol/drugs  Plan Patient expressed understanding  ~Followup  Interval:  After testing    [address], am scribing for and in the presence of Dorothy Del Castillo MD.   SignedErin 03/03/20 1:26 PM   Provider [address] is working as a scribe for and in the presence of justine Del Castillo MD). Working as a scribe, Erin Cheek may have prepopulated components of this note with my historical  intellectual property under my direct supervision. Any additions to this intellectual property were performed in my presence and at my direction. Furthermore, the content and accuracy of this note have been reviewed by me Dorothy Del Castillo MD).  3/4/2020 1:47 PM  .        If you have questions, please do not hesitate to call me. I look forward to following [facility] along with you.     Sincerely,        Eleonora Tamayo MD

## 2020-03-11 ENCOUNTER — OFFICE VISIT (OUTPATIENT)
Dept: ENT CLINIC | Age: 65
End: 2020-03-11
Payer: COMMERCIAL

## 2020-03-11 ENCOUNTER — HOSPITAL ENCOUNTER (OUTPATIENT)
Dept: WOMENS IMAGING | Age: 65
Discharge: HOME | End: 2020-03-11
Payer: COMMERCIAL

## 2020-03-11 VITALS — HEART RATE: 66 BPM | DIASTOLIC BLOOD PRESSURE: 94 MMHG | SYSTOLIC BLOOD PRESSURE: 158 MMHG | OXYGEN SATURATION: 96 %

## 2020-03-11 PROCEDURE — 77067 SCR MAMMO BI INCL CAD: CPT

## 2020-03-11 PROCEDURE — 99203 OFFICE O/P NEW LOW 30 MIN: CPT | Performed by: OTOLARYNGOLOGY

## 2020-03-11 NOTE — PROGRESS NOTES
disease), Hyperlipidemia, Hypertension, Knee pain, left, Migraine without status migrainosus, not intractable (12/21/2016), Osteoarthrosis involving lower leg, Periodic limb movement disorder, and Suicidal overdose ([facility] [address] (12/21/2016). Family History (Reference written patient paperwork in media section.)  Her family history includes Depression in her son; Drug abuse in her son. Past Surgical History   She has a past surgical history that includes abdomen limited ultrasound (2008); lap,uterus,unlisted procedure (1994); Joint replacement; Endometrial ablation; Gallbladder surgery; knee scope,diagnostic (2007); appendectomy (12/2017); and Salpingoophorectomy (Right, 12/2017). Social History   She reports that she has been smoking cigarettes. She has a 1.50 pack-year smoking history. She has never used smokeless tobacco. She reports that she does not drink alcohol or use drugs. She is now down to 5 or 6/day    Review of Systems   Constitutional: Negative for chills, fatigue, fever and unexpected weight change. HENT: Negative for trouble swallowing. Respiratory: Negative for cough, chest tightness and shortness of breath. Cardiovascular: Negative for chest pain and leg swelling. Gastrointestinal: Negative for abdominal pain, blood in stool, diarrhea, nausea and vomiting. Endocrine: Negative for cold intolerance and heat intolerance. Musculoskeletal: Negative for arthralgias and back pain. Skin: Negative for rash. Neurological: Negative for seizures, syncope and headaches. Hematological: Does not bruise/bleed easily. Psychiatric/Behavioral: Negative for agitation, behavioral problems, self-injury and suicidal ideas. There are depression issues however related to the death of her son who committed suicide after returning from the [employer] with PTSD    GENERAL:   The patient appears well developed and well nourished.       The head is normocephalic and atraumatic; no facial scars or weakness

## 2020-03-13 ENCOUNTER — HOSPITAL ENCOUNTER (OUTPATIENT)
Dept: NON INVASIVE DIAGNOSTICS | Age: 65
Discharge: HOME | End: 2020-03-13
Payer: COMMERCIAL

## 2020-03-13 LAB
LV EF: 58 %
LVEF MODALITY: NORMAL

## 2020-03-13 PROCEDURE — A9502 TC99M TETROFOSMIN: HCPCS | Performed by: INTERNAL MEDICINE

## 2020-03-13 PROCEDURE — 93017 CV STRESS TEST TRACING ONLY: CPT | Performed by: INTERNAL MEDICINE

## 2020-03-13 PROCEDURE — 78452 HT MUSCLE IMAGE SPECT MULT: CPT

## 2020-03-13 PROCEDURE — 3430000000 HC RX DIAGNOSTIC RADIOPHARMACEUTICAL: Performed by: INTERNAL MEDICINE

## 2020-03-13 RX ADMIN — TETROFOSMIN 30 MILLICURIE: 1.38 INJECTION, POWDER, LYOPHILIZED, FOR SOLUTION INTRAVENOUS at 14:09

## 2020-03-13 RX ADMIN — TETROFOSMIN 10 MILLICURIE: 1.38 INJECTION, POWDER, LYOPHILIZED, FOR SOLUTION INTRAVENOUS at 13:21

## 2020-03-13 NOTE — PROGRESS NOTES
Patient instructed on Alex Protocol Stress Test Procedure including possible side effects and adverse reactions. Verbalizes knowledge and understanding and denies having any questions.

## 2023-09-21 ENCOUNTER — OFFICE VISIT (OUTPATIENT)
Age: 68
End: 2023-09-21

## 2023-09-21 VITALS
DIASTOLIC BLOOD PRESSURE: 90 MMHG | HEIGHT: 60 IN | SYSTOLIC BLOOD PRESSURE: 152 MMHG | RESPIRATION RATE: 18 BRPM | WEIGHT: 198.7 LBS | TEMPERATURE: 98.9 F | BODY MASS INDEX: 39.01 KG/M2 | OXYGEN SATURATION: 94 % | HEART RATE: 88 BPM

## 2023-09-21 DIAGNOSIS — J06.9 UPPER RESPIRATORY TRACT INFECTION, UNSPECIFIED TYPE: Primary | ICD-10-CM

## 2023-09-21 DIAGNOSIS — J02.9 SORE THROAT: ICD-10-CM

## 2023-09-21 LAB
Lab: NORMAL
QC PASS/FAIL: NORMAL
SARS-COV-2 RDRP RESP QL NAA+PROBE: NEGATIVE
STREPTOCOCCUS A RNA: NEGATIVE

## 2023-09-21 RX ORDER — DOXYCYCLINE HYCLATE 100 MG
100 TABLET ORAL 2 TIMES DAILY
Qty: 20 TABLET | Refills: 0 | Status: SHIPPED | OUTPATIENT
Start: 2023-09-21 | End: 2023-10-01

## 2023-09-21 RX ORDER — BENZONATATE 100 MG/1
100 CAPSULE ORAL EVERY 8 HOURS PRN
Qty: 20 CAPSULE | Refills: 0 | Status: SHIPPED | OUTPATIENT
Start: 2023-09-21

## 2023-09-21 NOTE — PROGRESS NOTES
Gurdeep Escamilla (:  1955) is a 74 y.o. female,New patient, here for evaluation of the following chief complaint(s):  Pharyngitis (Sinus congestion, sore throat, headache, pain in the right ear x 3 weeks )      ASSESSMENT/PLAN:    ICD-10-CM    1. Upper respiratory tract infection, unspecified type  J06.9 doxycycline hyclate (VIBRA-TABS) 100 MG tablet     benzonatate (TESSALON) 100 MG capsule      2. Sore throat  J02.9 POCT Rapid Strep A DNA     POCT COVID-19 Rapid, NAAT     doxycycline hyclate (VIBRA-TABS) 100 MG tablet        Results for POC orders placed in visit on 23   POCT Rapid Strep A DNA   Result Value Ref Range    Streptococcus A RNA negative      POCT COVID-19 Rapid, NAAT  Order: [id number]  Status: Final result     Visible to patient: Yes (not seen)     Next appt: None     Dx: Sore throat     0 Result Notes       Component Ref Range & Units 23 1548   SARS-COV-2, RdRp gene Negative Negative    Lot Number  [id number]    QC Pass/Fail  pass               Specimen Collected: 23 15:48 EDT Last Resulted: 23 15:48 EDT             Keep hydrated, tylenol or ibuprofen (if no contraindications) as needed if pain or fever. .  follow up in 2-3 7-10 days if not better  Return or go to the ER if symptoms worse/feeling worse or has new symptoms or concerns    Advised follow up with her PCP if cough continues despite medication-for further evaluation-recommended to her to discuss getting her PCP to order chest x-ray  Follow up in 7 days if symptoms persist or if symptoms worsen. SUBJECTIVE/OBJECTIVE:HPI  Patients says has been coughing for past 2-3 weeks, now cough becoming productive (yellow). .  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 .  no fever, no trouble swallowing or breathing, no wheezing, no V/V/D, no chest paint  Initially seen at different urgent care-tested (-) for flu/covid and no prescriptions provided to patient

## 2023-09-21 NOTE — PATIENT INSTRUCTIONS
Keep hydrated, tylenol or ibuprofen (if no contraindications) as needed if pain or fever. .  follow up in 2-3 7-10 days if not better  Return or go to the ER if symptoms worse/feeling worse or has new symptoms or concerns

## 2023-09-22 ASSESSMENT — ENCOUNTER SYMPTOMS
WHEEZING: 0
DIARRHEA: 0
COUGH: 1
EYE DISCHARGE: 0
ABDOMINAL PAIN: 0
STRIDOR: 0
SORE THROAT: 1
RHINORRHEA: 0
CHEST TIGHTNESS: 0
VOMITING: 0
SINUS PAIN: 0
EYE REDNESS: 0
VOICE CHANGE: 0
BACK PAIN: 0
SHORTNESS OF BREATH: 0

## 2024-02-02 ENCOUNTER — OFFICE VISIT (OUTPATIENT)
Age: 69
End: 2024-02-02

## 2024-02-02 VITALS
HEART RATE: 73 BPM | DIASTOLIC BLOOD PRESSURE: 77 MMHG | OXYGEN SATURATION: 95 % | WEIGHT: 200 LBS | TEMPERATURE: 98.1 F | SYSTOLIC BLOOD PRESSURE: 122 MMHG | BODY MASS INDEX: 39.27 KG/M2 | HEIGHT: 60 IN

## 2024-02-02 DIAGNOSIS — J01.00 ACUTE NON-RECURRENT MAXILLARY SINUSITIS: ICD-10-CM

## 2024-02-02 DIAGNOSIS — J02.9 SORE THROAT: Primary | ICD-10-CM

## 2024-02-02 LAB — STREPTOCOCCUS A RNA: NEGATIVE

## 2024-02-02 RX ORDER — DOXYCYCLINE HYCLATE 100 MG
100 TABLET ORAL 2 TIMES DAILY
Qty: 20 TABLET | Refills: 0 | Status: SHIPPED | OUTPATIENT
Start: 2024-02-02 | End: 2024-02-12

## 2024-02-02 NOTE — PROGRESS NOTES
Kasey Sibley (:  1955) is a 68 y.o. female,Established patient, here for evaluation of the following chief complaint(s):  Pharyngitis (Sore throat and earache, started this morning.)      ASSESSMENT/PLAN:    ICD-10-CM    1. Sore throat  J02.9 POCT Rapid Strep A DNA      2. Acute non-recurrent maxillary sinusitis  J01.00 doxycycline hyclate (VIBRA-TABS) 100 MG tablet        Results for POC orders placed in visit on 24   POCT Rapid Strep A DNA   Result Value Ref Range    Streptococcus A RNA NEGATIVE      Wants antibiotic knows this is beginning of sisinus infection  Discussed this could still be viral or allegies..     Keep hydrated, tylenol or ibuprofen (if no contraindications) as needed if pain or fever.. gargle-cool liquids.. try one a day over the counter Zyrtec/flonase.... follow up in  7- days if not better  Return sooner or go to the ER if symptoms worse/feeling worse or has new symptoms or concerns       SUBJECTIVE/OBJECTIVE:  Patient presents with:  Pharyngitis: Sore throat / sinus congestion and  right earache, started this morning.         History provided by:  Patient   used: No        Vitals:    24 1300   BP: 122/77   Pulse: 73   Temp: 98.1 °F (36.7 °C)   TempSrc: Oral   SpO2: 95%   Weight: 90.7 kg (200 lb)   Height: 1.524 m (5')       Review of Systems   HENT:  Positive for congestion, ear pain, sinus pain and sore throat. Negative for ear discharge and rhinorrhea.    Respiratory:  Negative for cough.    Gastrointestinal:  Negative for nausea and vomiting.   Musculoskeletal:  Negative for myalgias.   Neurological:  Negative for headaches.       Physical Exam    Physical  Vitals signs: reviewed  Constitutional:  appearance: well nourished ..  does not appear acutely ill     Eyes:                 Pupil: equal-round-reactive to light, no photophobia, EOMI            Cornea: clear            Sclera: clear, non injected, non icteric    Ears: Right canal clear / TM

## 2024-02-02 NOTE — PATIENT INSTRUCTIONS
Keep hydrated, tylenol or ibuprofen (if no contraindications) as needed if pain or fever.. gargle-cool liquids.. try one a day over the counter Zyrtec/flonase.... follow up in  7- days if not better  Return sooner or go to the ER if symptoms worse/feeling worse or has new symptoms or concerns

## 2024-02-03 ASSESSMENT — ENCOUNTER SYMPTOMS
SINUS PAIN: 1
SORE THROAT: 1
COUGH: 0
NAUSEA: 0
VOMITING: 0
RHINORRHEA: 0

## 2024-07-25 ENCOUNTER — OFFICE (OUTPATIENT)
Dept: URBAN - METROPOLITAN AREA CLINIC 17 | Facility: CLINIC | Age: 69
End: 2024-07-25

## 2024-07-25 VITALS
WEIGHT: 200.5 LBS | HEIGHT: 60 IN | HEART RATE: 74 BPM | OXYGEN SATURATION: 96 % | SYSTOLIC BLOOD PRESSURE: 130 MMHG | DIASTOLIC BLOOD PRESSURE: 80 MMHG

## 2024-07-25 DIAGNOSIS — R14.0 ABDOMINAL DISTENSION (GASEOUS): ICD-10-CM

## 2024-07-25 DIAGNOSIS — K59.00 CONSTIPATION, UNSPECIFIED: ICD-10-CM

## 2024-07-25 DIAGNOSIS — E66.9 OBESITY, UNSPECIFIED: ICD-10-CM

## 2024-07-25 PROCEDURE — 99204 OFFICE O/P NEW MOD 45 MIN: CPT | Performed by: INTERNAL MEDICINE

## 2024-07-25 RX ORDER — CALCIUM POLYCARBOPHIL 625 MG/1
625 TABLET ORAL
Qty: 30 | Refills: 2 | Status: ACTIVE
Start: 2024-07-25

## 2025-08-02 ENCOUNTER — OFFICE VISIT (OUTPATIENT)
Age: 70
End: 2025-08-02

## 2025-08-02 VITALS
HEART RATE: 77 BPM | SYSTOLIC BLOOD PRESSURE: 138 MMHG | WEIGHT: 205 LBS | DIASTOLIC BLOOD PRESSURE: 88 MMHG | HEIGHT: 61 IN | OXYGEN SATURATION: 94 % | RESPIRATION RATE: 14 BRPM | TEMPERATURE: 98.7 F | BODY MASS INDEX: 38.71 KG/M2

## 2025-08-02 DIAGNOSIS — J01.00 ACUTE MAXILLARY SINUSITIS, RECURRENCE NOT SPECIFIED: Primary | ICD-10-CM

## 2025-08-02 LAB
Lab: NORMAL
PERFORMING INSTRUMENT: NORMAL
QC PASS/FAIL: NORMAL
SARS-COV-2, POC: NORMAL

## 2025-08-02 RX ORDER — TRAZODONE HYDROCHLORIDE 50 MG/1
50 TABLET ORAL NIGHTLY
COMMUNITY
Start: 2025-04-16

## 2025-08-02 RX ORDER — DOXYCYCLINE HYCLATE 100 MG
100 TABLET ORAL 2 TIMES DAILY
Qty: 20 TABLET | Refills: 0 | Status: SHIPPED | OUTPATIENT
Start: 2025-08-02 | End: 2025-08-12

## 2025-08-02 RX ORDER — FLUCONAZOLE 150 MG/1
150 TABLET ORAL ONCE
Qty: 1 TABLET | Refills: 0 | Status: SHIPPED | OUTPATIENT
Start: 2025-08-02 | End: 2025-08-02

## 2025-08-02 ASSESSMENT — ENCOUNTER SYMPTOMS
SINUS PRESSURE: 1
SINUS PAIN: 1
SINUS COMPLAINT: 1

## 2025-08-02 NOTE — PROGRESS NOTES
Kasey Sibley (:  1955) is a 69 y.o. female,Established patient, here for evaluation of the following chief complaint(s):  Sinus Problem (Patient c/o nasal congestion, ear pain, post nasal drip, sore throat, chills and headaches  x 3 days)      ASSESSMENT/PLAN:  1. Acute maxillary sinusitis, recurrence not specified  - POCT COVID-19, Antigen NEGATIVE  - doxycycline hyclate (VIBRA-TABS) 100 MG tablet; Take 1 tablet by mouth 2 times daily for 10 days  Dispense: 20 tablet; Refill: 0       Return if symptoms worsen or fail to improve.    SUBJECTIVE/OBJECTIVE:  PRESENT TO CLINIC WITH SINUS CONGESTION FOR ,NASAL CONGESTION, POSTNASAL DRIP ,HEADACHEAND EAR PAIN FOR THREE DAYS. NO FEVER. NO SICK CONTACT. NO FEVER. GREEN PHLEGM      History provided by:  Patient  Sinus Problem  Associated symptoms include congestion and sinus pressure.       Vitals:    25 1825 25 1849   BP: (!) 171/87 138/88   BP Site: Right Upper Arm Right Upper Arm   Patient Position: Sitting Sitting   BP Cuff Size: Large Adult Large Adult   Pulse: 77    Resp: 14    Temp: 98.7 °F (37.1 °C)    TempSrc: Temporal    SpO2: 94%    Weight: 93 kg (205 lb)    Height: 1.549 m (5' 1\")        Review of Systems   HENT:  Positive for congestion, sinus pressure and sinus pain.        Physical Exam  Constitutional:       Appearance: Normal appearance.   HENT:      Head: Normocephalic and atraumatic.      Nose: Congestion present.      Mouth/Throat:      Mouth: Mucous membranes are moist.   Eyes:      Pupils: Pupils are equal, round, and reactive to light.   Pulmonary:      Effort: Pulmonary effort is normal.      Breath sounds: Normal breath sounds.   Musculoskeletal:         General: Normal range of motion.      Cervical back: Normal range of motion and neck supple.   Skin:     General: Skin is warm.   Neurological:      Mental Status: She is alert and oriented to person, place, and time.   Psychiatric:         Mood and Affect: Mood normal.